# Patient Record
Sex: FEMALE | Race: WHITE
[De-identification: names, ages, dates, MRNs, and addresses within clinical notes are randomized per-mention and may not be internally consistent; named-entity substitution may affect disease eponyms.]

---

## 2018-05-12 ENCOUNTER — HOSPITAL ENCOUNTER (EMERGENCY)
Dept: HOSPITAL 80 - FED | Age: 33
LOS: 1 days | Discharge: PSYCHIATRIC OR SUBSTANCE USE FACILITY | End: 2018-05-13
Payer: MEDICAID

## 2018-05-12 DIAGNOSIS — F31.4: Primary | ICD-10-CM

## 2018-05-12 LAB — PLATELET # BLD: 175 10^3/UL (ref 150–400)

## 2018-05-12 PROCEDURE — G0480 DRUG TEST DEF 1-7 CLASSES: HCPCS

## 2018-05-12 RX ADMIN — LEVOTHYROXINE SODIUM SCH MCG: 150 TABLET ORAL at 14:43

## 2018-05-12 NOTE — EDPHY
H & P


Source: Patient, Police, RN/MD


Exam Limitations: No limitations


Time Seen by Provider: 18 09:17


HPI/ROS: 


HPI:  This is a 32-year-old female who presents with





Chief Complaint:  M1 hold for suicidal ideation





Location: psych 


Quality:  M1 hold for suicidal ideation


Duration:  Months


Signs and Symptoms: no auditory and visual command hallucinations, + suicidal 

ideation with a plan, no homicidal ideation, not paranoid 


Timing:  Chronic


Severity:  Moderate


Context:  Patient has a history of bipolar 1 disorder-depressive type takes 

Tegretol, Klonopin, clonidine, Zyprexa and Effexor presents on M1 hold by 

police for suicidal ideation.  Patient was recently inpatient psychiatric 

hospital for suicidal ideation and severe depression.  Patient reports that she 

was discharged yesterday.  She has no home, no family or friends.  Patient 

reports that she still feels severely depressed and is afraid that she may end 

her life.  Patient also reports that she takes Adderall 30 mg twice daily for 

attention deficit hyperactivity disorder.  Patient has not had her Adderall in 1

-2 days. 


Modifying Factors:  None





Comment: 








ROS: see HPI


Constitutional: No fever, no chills, no weight loss


Eyes:  No blurred vision


Respiratory:  No shortness of breath, no cough


Cardiovascular:  No chest pain


Gastrointestinal:  No nausea, no vomiting, no diarrhea 


Genitourinary:  No dysuria 


Extremities:  No myalgias 


Neurologic:  No weakness, no numbness


Skin:  No rashes


Hematologic:  No bruising, no bleeding





MEDICAL/SURGICAL/SOCIAL HISTORY: 


Medical history:  Hypothyroidism, bipolar 1 disorder, depression, attention 

deficit hyperactivity disorder.


Surgical history:   x2


Social history:  Family history noncontributory.








CONSTITUTIONAL: awake and alert, no obvious distress


HEENT: Atraumatic and normocephalic, PERRL, EOMI.  Nares patent; no rhinorrhea;

  no nasal mucosal edema. Tympanic membranes clear. Oropharynx clear, no 

exudate and moist pink mucosa.  Airway patent.  No lymphadenopathy.  No 

meningismus.


Cardiovascular: Normal S1/S2, regular rate, regular rhythm, without murmur rub 

or gallop.


PULMONARY/CHEST:  Symmetrical and nontender. Clear to auscultation bilaterally. 

Good air movement. No accessory muscle usage.


ABDOMEN:  Soft, nondistended, nontender, no rebound, no guarding, no peritoneal 

signs, no masses or organomegaly. No CVAT.


EXTREMITIES:  2/2 pulses, strength 5/5, no deformities, no clubbing, no 

cyanosis or edema.


NEUROLOGICAL: no focal neuro deficits.  GCS 15.


SKIN: Warm and dry, tattoos noted on arm, no erythema. no rash.  Good capillary 

refill. 


PSYCH:  Good eye contact, no flight of ideas, organized thought process, fair 

insight and judgment, no auditory and visual command hallucinations, + suicidal 

ideation with a plan, no homicidal ideation, not paranoid 


 (Yola,Terra)


Constitutional: 


 Initial Vital Signs











Temperature (C)  37 C   18 09:41


 


Heart Rate  100   18 09:41


 


Respiratory Rate  12   18 09:41


 


Blood Pressure  130/70 H  18 09:41


 


O2 Sat (%)  99   18 09:41








 











O2 Delivery Mode               Room Air














Allergies/Adverse Reactions: 


 





clarithromycin [From Biaxin] Allergy (Verified 18 12:55)


 








Home Medications: 














 Medication  Instructions  Recorded


 


Levothyroxine [Synthroid 150 mcg 150 mcg PO DAILY06 18





(*)]  


 


OLANZapine [Zyprexa] 10 mg PO DAILY 18


 


Venlafaxine HCl [Venlafaxine 75MG 150 mg PO DAILY 18





(*)]  


 


carBAMazepine [Tegretol Xr] 200 mg PO BID 18


 


clonIDINE [Catapres (*)] 0.3 mg PO TID 18


 


clonazePAM [klonoPIN (*)] 1 mg PO TID PRN 18














Medical Decision Making


ED Course/Re-evaluation: 


0918: Agree with M1 hold upon arrival.  Labs and UDS ordered.  Patient 

currently calm and cooperative. 


1048:  Labs and urine reviewed.  No signs of leukocytosis/anemia/AMIE/

electrolyte imbalance/pregnancy.  Medically clear for mental health evaluation. 


1100:  [facility] notified. 


1412:  Notified by nurse that patient is requesting home medications be 

ordered. 


1543: [facility] recommends inpatient psychiatric hospitalization and is looking for 

placement. 


1700:  End of shift.  Signed over to Dr. Livingston pending placement.  Patient is 

sleeping soundly.  Nurse reports that she is drinking fluids without 

difficulty. 











This patient was seen under the supervision of my secondary supervising 

physician.  I evaluated care for this patient independently.  Discussed this 

patient with Dr. Tee who did not see the patient.   (Shannan Aceves)





Patient is been evaluated.  We are awaiting psychiatric placement.  Care 

transferred to Dr. Garcia at shift change. (Dylan Livingston)





0627:  Patient here with bipolar disorder on M1 hold.  Suicidal ideation.  

Pending inpatient psychiatric placement.


No acute events overnight.


Patient signed over to Dr. Silva at 7am Shift-Change.  (Billy Garcia)


Differential Diagnosis: 


Differential diagnosis includes but is not limited to functional in situational 

depression, bipolar disorder depressive type, schizoaffective disorder. 


 (Shannan Aceves)


Other Provider: 





Care assumed at 7:00 a.m. On Nixon May 13th, plan for inpatient psychiatric 

admission.


741:  Medication reconciliation is been completed, continued on her usual 

outpatient medications.


1129: The patient will be [facility] for inpatient 

psychiatric hospital bed not available at this facility, in stable condition; 

accepting physician is Dr. Sousa.  [employer] form completed. (Kendell Silva)





- Data Points


Laboratory Results: 


 Laboratory Results





 18 10:05 





 18 10:05 








Medications Given: 


 





Carbamazepine (Tegretol Xr)  200 mg PO BID BRYAN


   Stop: 18 20:59


   Last Admin: 18 07:59 Dose:  200 mg


Levothyroxine Sodium (Synthroid)  150 mcg PO DAILY AT 6AM BRYAN


   Stop: 18 05:59


   Last Admin: 18 07:31 Dose:  150 mcg


Olanzapine (Olanzapine)  5 mg PO HS BRYAN


   Stop: 18 20:59


   Last Admin: 18 21:21 Dose:  5 mg





Discontinued Medications





Acetaminophen (Tylenol)  1,000 mg PO EDNOW ONE


   Stop: 18 12:52


   Last Admin: 18 12:53 Dose:  1,000 mg


Clonazepam (Klonopin)  1 mg PO EDNOW ONE


   Stop: 18 21:57


   Last Admin: 18 22:30 Dose:  1 mg


Clonazepam (Klonopin)  1 mg PO EDNOW ONE


   Stop: 18 07:40


   Last Admin: 18 07:59 Dose:  1 mg


Clonidine (Catapres)  0.2 mg PO TID ONE


   Stop: 18 14:13


   Last Admin: 18 14:18 Dose:  0.2 mg


Clonidine (Catapres)  0.2 mg PO EDNOW ONE


   Stop: 18 22:32


   Last Admin: 18 22:32 Dose:  0.2 mg


Ibuprofen (Motrin)  600 mg PO EDNOW ONE


   Stop: 18 18:33


   Last Admin: 18 18:34 Dose:  600 mg


Ibuprofen (Motrin)  600 mg PO EDNOW ONE


   Stop: 18 21:20


   Last Admin: 18 21:19 Dose:  600 mg


Ibuprofen (Motrin)  600 mg PO EDNOW ONE


   Stop: 18 05:30


   Last Admin: 18 05:30 Dose:  600 mg


Olanzapine (Olanzapine)  5 mg PO ONCE ONE


   Stop: 18 14:13


   Last Admin: 18 14:18 Dose:  5 mg


Venlafaxine HCl (Effexor Xr)  150 mg PO EDNOW ONE


   Stop: 18 07:43


   Last Admin: 18 07:59 Dose:  150 mg








Departure





- Departure


Disposition: Other Psych, Not [employer]


Clinical Impression: 


 Severe major depression, Bipolar disorder with current episode depressed





Condition: Good


Referrals: 


Patient,NotPresent [Primary Care Provider] - As per Instructions

## 2018-05-13 VITALS — DIASTOLIC BLOOD PRESSURE: 70 MMHG | SYSTOLIC BLOOD PRESSURE: 100 MMHG

## 2018-05-13 RX ADMIN — LEVOTHYROXINE SODIUM SCH MCG: 150 TABLET ORAL at 07:31
